# Patient Record
Sex: MALE | Race: WHITE | NOT HISPANIC OR LATINO | URBAN - METROPOLITAN AREA
[De-identification: names, ages, dates, MRNs, and addresses within clinical notes are randomized per-mention and may not be internally consistent; named-entity substitution may affect disease eponyms.]

---

## 2019-01-23 ENCOUNTER — OFFICE (OUTPATIENT)
Dept: URBAN - METROPOLITAN AREA CLINIC 19 | Facility: CLINIC | Age: 69
End: 2019-01-23
Payer: MEDICARE

## 2019-01-23 VITALS
HEART RATE: 63 BPM | SYSTOLIC BLOOD PRESSURE: 104 MMHG | DIASTOLIC BLOOD PRESSURE: 67 MMHG | WEIGHT: 212 LBS | HEIGHT: 69 IN

## 2019-01-23 DIAGNOSIS — D50.9 IRON DEFICIENCY ANEMIA, UNSPECIFIED: ICD-10-CM

## 2019-01-23 DIAGNOSIS — I48.91 UNSPECIFIED ATRIAL FIBRILLATION: ICD-10-CM

## 2019-01-23 DIAGNOSIS — Z80.0 FAMILY HISTORY OF MALIGNANT NEOPLASM OF DIGESTIVE ORGANS: ICD-10-CM

## 2019-01-23 DIAGNOSIS — Z72.89 OTHER PROBLEMS RELATED TO LIFESTYLE: ICD-10-CM

## 2019-01-23 DIAGNOSIS — K21.9 GASTRO-ESOPHAGEAL REFLUX DISEASE WITHOUT ESOPHAGITIS: ICD-10-CM

## 2019-01-23 DIAGNOSIS — Z79.01 LONG TERM (CURRENT) USE OF ANTICOAGULANTS: ICD-10-CM

## 2019-01-23 DIAGNOSIS — C90.00 MULTIPLE MYELOMA NOT HAVING ACHIEVED REMISSION: ICD-10-CM

## 2019-01-23 DIAGNOSIS — R74.8 ABNORMAL LEVELS OF OTHER SERUM ENZYMES: ICD-10-CM

## 2019-01-23 DIAGNOSIS — D69.6 THROMBOCYTOPENIA, UNSPECIFIED: ICD-10-CM

## 2019-01-23 LAB
CREATINE KINASE,TOTAL: 44 U/L (ref 24–204)
HBSAG SCREEN: NEGATIVE
HCV ANTIBODY: HEP C VIRUS AB: <0.1 S/CO RATIO
HEP A AB, TOTAL: NEGATIVE
HEP B CORE AB, TOT: NEGATIVE
HEP B SURFACE AB: HEP B SURFACE AB, QUAL: NON REACTIVE
HEPATIC FUNCTION PANEL (7): ALBUMIN: 4.6 G/DL (ref 3.6–4.8)
HEPATIC FUNCTION PANEL (7): ALKALINE PHOSPHATASE: 27 IU/L — LOW (ref 39–117)
HEPATIC FUNCTION PANEL (7): ALT (SGPT): 40 IU/L (ref 0–44)
HEPATIC FUNCTION PANEL (7): AST (SGOT): 34 IU/L (ref 0–40)
HEPATIC FUNCTION PANEL (7): BILIRUBIN, DIRECT: 0.28 MG/DL (ref 0–0.4)
HEPATIC FUNCTION PANEL (7): BILIRUBIN, TOTAL: 1 MG/DL (ref 0–1.2)
HEPATIC FUNCTION PANEL (7): PROTEIN, TOTAL: 6.8 G/DL (ref 6–8.5)
VITAMIN B12 AND FOLATE: FOLATE (FOLIC ACID), SERUM: 10.1 NG/ML (ref 3–?)
VITAMIN B12 AND FOLATE: VITAMIN B12: 439 PG/ML (ref 232–1245)

## 2019-01-23 PROCEDURE — 99204 OFFICE O/P NEW MOD 45 MIN: CPT | Performed by: INTERNAL MEDICINE

## 2019-01-23 RX ORDER — SODIUM PICOSULFATE, MAGNESIUM OXIDE, AND ANHYDROUS CITRIC ACID 10; 3.5; 12 MG/160ML; G/160ML; G/160ML
LIQUID ORAL
Qty: 1 | Refills: 0 | Status: ACTIVE
Start: 2019-01-23

## 2019-05-03 ENCOUNTER — OFFICE (OUTPATIENT)
Dept: URBAN - METROPOLITAN AREA CLINIC 19 | Facility: CLINIC | Age: 69
End: 2019-05-03

## 2019-05-03 VITALS
HEIGHT: 69 IN | HEART RATE: 45 BPM | WEIGHT: 210 LBS | SYSTOLIC BLOOD PRESSURE: 141 MMHG | DIASTOLIC BLOOD PRESSURE: 82 MMHG

## 2019-05-03 DIAGNOSIS — R11.2 NAUSEA WITH VOMITING, UNSPECIFIED: ICD-10-CM

## 2019-05-03 DIAGNOSIS — Z79.01 LONG TERM (CURRENT) USE OF ANTICOAGULANTS: ICD-10-CM

## 2019-05-03 DIAGNOSIS — C90.00 MULTIPLE MYELOMA NOT HAVING ACHIEVED REMISSION: ICD-10-CM

## 2019-05-03 DIAGNOSIS — I48.91 UNSPECIFIED ATRIAL FIBRILLATION: ICD-10-CM

## 2019-05-03 DIAGNOSIS — K21.9 GASTRO-ESOPHAGEAL REFLUX DISEASE WITHOUT ESOPHAGITIS: ICD-10-CM

## 2019-05-03 LAB
AMYLASE: 85 U/L (ref 31–124)
HEPATIC FUNCTION PANEL (7): ALBUMIN: 4.6 G/DL (ref 3.6–4.8)
HEPATIC FUNCTION PANEL (7): ALKALINE PHOSPHATASE: 35 IU/L — LOW (ref 39–117)
HEPATIC FUNCTION PANEL (7): ALT (SGPT): 87 IU/L — HIGH (ref 0–44)
HEPATIC FUNCTION PANEL (7): AST (SGOT): 95 IU/L — HIGH (ref 0–40)
HEPATIC FUNCTION PANEL (7): BILIRUBIN, DIRECT: 0.58 MG/DL — HIGH (ref 0–0.4)
HEPATIC FUNCTION PANEL (7): BILIRUBIN, TOTAL: 2.1 MG/DL — HIGH (ref 0–1.2)
HEPATIC FUNCTION PANEL (7): PROTEIN, TOTAL: 7 G/DL (ref 6–8.5)
LIPASE: 86 U/L — HIGH (ref 13–78)

## 2019-05-03 PROCEDURE — 99214 OFFICE O/P EST MOD 30 MIN: CPT | Performed by: INTERNAL MEDICINE

## 2019-05-09 ENCOUNTER — AMBULATORY SURGICAL CENTER (OUTPATIENT)
Dept: URBAN - METROPOLITAN AREA SURGERY 2 | Facility: SURGERY | Age: 69
End: 2019-05-09

## 2019-05-09 ENCOUNTER — AMBULATORY SURGICAL CENTER (OUTPATIENT)
Dept: URBAN - METROPOLITAN AREA SURGERY 2 | Facility: SURGERY | Age: 69
End: 2019-05-09
Payer: MEDICARE

## 2019-05-09 ENCOUNTER — OFFICE (OUTPATIENT)
Dept: URBAN - METROPOLITAN AREA PATHOLOGY 22 | Facility: PATHOLOGY | Age: 69
End: 2019-05-09

## 2019-05-09 VITALS
HEART RATE: 95 BPM | TEMPERATURE: 98.4 F | OXYGEN SATURATION: 94 % | DIASTOLIC BLOOD PRESSURE: 105 MMHG | RESPIRATION RATE: 20 BRPM | SYSTOLIC BLOOD PRESSURE: 134 MMHG | RESPIRATION RATE: 20 BRPM | RESPIRATION RATE: 18 BRPM | DIASTOLIC BLOOD PRESSURE: 94 MMHG | OXYGEN SATURATION: 96 % | DIASTOLIC BLOOD PRESSURE: 95 MMHG | HEIGHT: 69 IN | DIASTOLIC BLOOD PRESSURE: 94 MMHG | WEIGHT: 210 LBS | SYSTOLIC BLOOD PRESSURE: 141 MMHG | TEMPERATURE: 98.9 F | TEMPERATURE: 98.9 F | DIASTOLIC BLOOD PRESSURE: 90 MMHG | DIASTOLIC BLOOD PRESSURE: 105 MMHG | WEIGHT: 210 LBS | OXYGEN SATURATION: 96 % | HEART RATE: 101 BPM | RESPIRATION RATE: 18 BRPM | DIASTOLIC BLOOD PRESSURE: 95 MMHG | OXYGEN SATURATION: 95 % | HEART RATE: 95 BPM | SYSTOLIC BLOOD PRESSURE: 144 MMHG | SYSTOLIC BLOOD PRESSURE: 141 MMHG | SYSTOLIC BLOOD PRESSURE: 154 MMHG | DIASTOLIC BLOOD PRESSURE: 90 MMHG | HEART RATE: 106 BPM | HEIGHT: 69 IN | HEART RATE: 101 BPM | OXYGEN SATURATION: 95 % | HEART RATE: 106 BPM | SYSTOLIC BLOOD PRESSURE: 134 MMHG | TEMPERATURE: 98.4 F | OXYGEN SATURATION: 94 % | SYSTOLIC BLOOD PRESSURE: 154 MMHG | SYSTOLIC BLOOD PRESSURE: 144 MMHG

## 2019-05-09 DIAGNOSIS — K22.8 OTHER SPECIFIED DISEASES OF ESOPHAGUS: ICD-10-CM

## 2019-05-09 DIAGNOSIS — R11.2 NAUSEA WITH VOMITING, UNSPECIFIED: ICD-10-CM

## 2019-05-09 DIAGNOSIS — K44.9 DIAPHRAGMATIC HERNIA WITHOUT OBSTRUCTION OR GANGRENE: ICD-10-CM

## 2019-05-09 DIAGNOSIS — K31.89 OTHER DISEASES OF STOMACH AND DUODENUM: ICD-10-CM

## 2019-05-09 PROCEDURE — 43239 EGD BIOPSY SINGLE/MULTIPLE: CPT | Performed by: INTERNAL MEDICINE

## 2019-05-09 PROCEDURE — 88342 IMHCHEM/IMCYTCHM 1ST ANTB: CPT | Performed by: INTERNAL MEDICINE

## 2019-05-09 PROCEDURE — 88305 TISSUE EXAM BY PATHOLOGIST: CPT | Performed by: INTERNAL MEDICINE

## 2019-05-09 PROCEDURE — G8918 PT W/O PREOP ORDER IV AB PRO: HCPCS | Performed by: INTERNAL MEDICINE

## 2019-05-09 PROCEDURE — 88313 SPECIAL STAINS GROUP 2: CPT | Performed by: INTERNAL MEDICINE

## 2019-05-09 PROCEDURE — G8907 PT DOC NO EVENTS ON DISCHARG: HCPCS | Performed by: INTERNAL MEDICINE

## 2019-07-16 ENCOUNTER — OFFICE (OUTPATIENT)
Dept: URBAN - METROPOLITAN AREA CLINIC 19 | Facility: CLINIC | Age: 69
End: 2019-07-16

## 2019-07-16 VITALS
SYSTOLIC BLOOD PRESSURE: 117 MMHG | WEIGHT: 200 LBS | DIASTOLIC BLOOD PRESSURE: 72 MMHG | HEIGHT: 69 IN | HEART RATE: 76 BPM

## 2019-07-16 DIAGNOSIS — C90.00 MULTIPLE MYELOMA NOT HAVING ACHIEVED REMISSION: ICD-10-CM

## 2019-07-16 DIAGNOSIS — K31.84 GASTROPARESIS: ICD-10-CM

## 2019-07-16 DIAGNOSIS — Z79.01 LONG TERM (CURRENT) USE OF ANTICOAGULANTS: ICD-10-CM

## 2019-07-16 DIAGNOSIS — K21.9 GASTRO-ESOPHAGEAL REFLUX DISEASE WITHOUT ESOPHAGITIS: ICD-10-CM

## 2019-07-16 DIAGNOSIS — I48.91 UNSPECIFIED ATRIAL FIBRILLATION: ICD-10-CM

## 2019-07-16 DIAGNOSIS — R74.8 ABNORMAL LEVELS OF OTHER SERUM ENZYMES: ICD-10-CM

## 2019-07-16 PROCEDURE — 99213 OFFICE O/P EST LOW 20 MIN: CPT | Performed by: INTERNAL MEDICINE

## 2019-11-13 ENCOUNTER — OFFICE (OUTPATIENT)
Dept: URBAN - METROPOLITAN AREA CLINIC 19 | Facility: CLINIC | Age: 69
End: 2019-11-13

## 2023-09-19 ENCOUNTER — OFFICE (OUTPATIENT)
Dept: URBAN - METROPOLITAN AREA CLINIC 11 | Facility: CLINIC | Age: 73
End: 2023-09-19

## 2023-09-19 VITALS
OXYGEN SATURATION: 91 % | WEIGHT: 227 LBS | HEIGHT: 69 IN | HEART RATE: 112 BPM | SYSTOLIC BLOOD PRESSURE: 120 MMHG | DIASTOLIC BLOOD PRESSURE: 71 MMHG

## 2023-09-19 DIAGNOSIS — Z79.01 LONG TERM (CURRENT) USE OF ANTICOAGULANTS: ICD-10-CM

## 2023-09-19 DIAGNOSIS — I48.91 UNSPECIFIED ATRIAL FIBRILLATION: ICD-10-CM

## 2023-09-19 DIAGNOSIS — C90.00 MULTIPLE MYELOMA NOT HAVING ACHIEVED REMISSION: ICD-10-CM

## 2023-09-19 PROCEDURE — 99204 OFFICE O/P NEW MOD 45 MIN: CPT | Performed by: INTERNAL MEDICINE

## 2023-09-19 RX ORDER — SODIUM PICOSULFATE, MAGNESIUM OXIDE, AND ANHYDROUS CITRIC ACID 10; 3.5; 12 MG/160ML; G/160ML; G/160ML
LIQUID ORAL
Qty: 350 | Refills: 0 | Status: COMPLETED
Start: 2023-09-19 | End: 2023-10-25

## 2023-10-25 ENCOUNTER — OFFICE (OUTPATIENT)
Dept: URBAN - METROPOLITAN AREA PATHOLOGY 12 | Facility: PATHOLOGY | Age: 73
End: 2023-10-25

## 2023-10-25 ENCOUNTER — AMBULATORY SURGICAL CENTER (OUTPATIENT)
Dept: URBAN - METROPOLITAN AREA SURGERY 3 | Facility: SURGERY | Age: 73
End: 2023-10-25

## 2023-10-25 VITALS
HEART RATE: 90 BPM | HEART RATE: 94 BPM | DIASTOLIC BLOOD PRESSURE: 89 MMHG | SYSTOLIC BLOOD PRESSURE: 135 MMHG | SYSTOLIC BLOOD PRESSURE: 147 MMHG | HEART RATE: 85 BPM | DIASTOLIC BLOOD PRESSURE: 92 MMHG | DIASTOLIC BLOOD PRESSURE: 92 MMHG | HEART RATE: 88 BPM | SYSTOLIC BLOOD PRESSURE: 158 MMHG | SYSTOLIC BLOOD PRESSURE: 161 MMHG | SYSTOLIC BLOOD PRESSURE: 121 MMHG | SYSTOLIC BLOOD PRESSURE: 147 MMHG | TEMPERATURE: 98.3 F | SYSTOLIC BLOOD PRESSURE: 147 MMHG | TEMPERATURE: 98.3 F | DIASTOLIC BLOOD PRESSURE: 82 MMHG | SYSTOLIC BLOOD PRESSURE: 161 MMHG | SYSTOLIC BLOOD PRESSURE: 135 MMHG | DIASTOLIC BLOOD PRESSURE: 85 MMHG | HEART RATE: 94 BPM | DIASTOLIC BLOOD PRESSURE: 85 MMHG | WEIGHT: 217.4 LBS | SYSTOLIC BLOOD PRESSURE: 161 MMHG | RESPIRATION RATE: 18 BRPM | OXYGEN SATURATION: 95 % | HEIGHT: 69 IN | RESPIRATION RATE: 18 BRPM | RESPIRATION RATE: 20 BRPM | HEIGHT: 69 IN | HEART RATE: 90 BPM | HEART RATE: 85 BPM | DIASTOLIC BLOOD PRESSURE: 89 MMHG | OXYGEN SATURATION: 94 % | HEART RATE: 88 BPM | DIASTOLIC BLOOD PRESSURE: 96 MMHG | DIASTOLIC BLOOD PRESSURE: 92 MMHG | HEART RATE: 88 BPM | DIASTOLIC BLOOD PRESSURE: 89 MMHG | DIASTOLIC BLOOD PRESSURE: 82 MMHG | SYSTOLIC BLOOD PRESSURE: 158 MMHG | RESPIRATION RATE: 20 BRPM | SYSTOLIC BLOOD PRESSURE: 121 MMHG | HEART RATE: 85 BPM | HEIGHT: 69 IN | OXYGEN SATURATION: 94 % | SYSTOLIC BLOOD PRESSURE: 121 MMHG | HEART RATE: 89 BPM | HEART RATE: 89 BPM | TEMPERATURE: 98.3 F | OXYGEN SATURATION: 95 % | DIASTOLIC BLOOD PRESSURE: 96 MMHG | OXYGEN SATURATION: 94 % | SYSTOLIC BLOOD PRESSURE: 158 MMHG | WEIGHT: 217.4 LBS | OXYGEN SATURATION: 95 % | RESPIRATION RATE: 20 BRPM | DIASTOLIC BLOOD PRESSURE: 82 MMHG | HEART RATE: 89 BPM | RESPIRATION RATE: 18 BRPM | WEIGHT: 217.4 LBS | HEART RATE: 90 BPM | SYSTOLIC BLOOD PRESSURE: 135 MMHG | DIASTOLIC BLOOD PRESSURE: 85 MMHG | HEART RATE: 94 BPM | DIASTOLIC BLOOD PRESSURE: 96 MMHG

## 2023-10-25 DIAGNOSIS — Z12.11 ENCOUNTER FOR SCREENING FOR MALIGNANT NEOPLASM OF COLON: ICD-10-CM

## 2023-10-25 DIAGNOSIS — K57.30 DIVERTICULOSIS OF LARGE INTESTINE WITHOUT PERFORATION OR ABS: ICD-10-CM

## 2023-10-25 DIAGNOSIS — D12.2 BENIGN NEOPLASM OF ASCENDING COLON: ICD-10-CM

## 2023-10-25 PROBLEM — K63.5 POLYP OF COLON: Status: ACTIVE | Noted: 2023-10-25

## 2023-10-25 PROCEDURE — 45385 COLONOSCOPY W/LESION REMOVAL: CPT | Mod: PT | Performed by: INTERNAL MEDICINE

## 2023-10-25 PROCEDURE — 88305 TISSUE EXAM BY PATHOLOGIST: CPT | Mod: TC | Performed by: STUDENT IN AN ORGANIZED HEALTH CARE EDUCATION/TRAINING PROGRAM

## 2023-10-25 NOTE — SERVICEHPINOTES
73-year-old male referred back by Dr. Mckenzie because of the need for actually greater than 10 year interval screening colonoscopy. There is no family history that actually places him at higher risk. He has multiple myeloma and has had 2 stem cell transplants in the past and is followed by Dr. Peraza at the [facility]. He is currently stable. 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 He does have a history of atrial fib on Xarelto and followed by Dr. Ward. his bowel movements or normal daily almost and regular with no bleeding. He has had no weight loss. He had diverticulosis only on a prior colonoscopy in 2012.

## 2023-10-25 NOTE — SERVICEHPINOTES
73-year-old male referred back by Dr. Mckenzie because of the need for actually greater than 10 year interval screening colonoscopy. There is no family history that actually places him at higher risk. He has multiple myeloma and has had 2 stem cell transplants in the past and is followed by Dr. Peraza at the [facility]. He is currently stable. 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 He does have a history of atrial fib on Xarelto and followed by Dr. Ward. his bowel movements or normal daily almost and regular with no bleeding. He has had no weight loss. He had diverticulosis only on a prior colonoscopy in 2012.

## 2023-10-25 NOTE — SERVICEHPINOTES
73-year-old male referred back by Dr. Mckenzie because of the need for actually greater than 10 year interval screening colonoscopy. There is no family history that actually places him at higher risk. He has multiple myeloma and has had 2 stem cell transplants in the past and is followed by Dr. Peraza at the [facility]. He is currently stable. 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 He does have a history of atrial fib on Xarelto and followed by Dr. Ward. his bowel movements or normal daily almost and regular with no bleeding. He has had no weight loss. He had diverticulosis only on a prior colonoscopy in 2012.